# Patient Record
Sex: MALE | Race: WHITE | HISPANIC OR LATINO | Employment: UNEMPLOYED | URBAN - METROPOLITAN AREA
[De-identification: names, ages, dates, MRNs, and addresses within clinical notes are randomized per-mention and may not be internally consistent; named-entity substitution may affect disease eponyms.]

---
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
## 2025-04-15 ENCOUNTER — TELEPHONE (OUTPATIENT)
Age: 20
End: 2025-04-15

## 2025-04-15 NOTE — TELEPHONE ENCOUNTER
New Patient   Insurance   Current Insurance? Did not have on hand at time of call - will bring to OV   Insurance E-verified?    History   Reason for appointment/active symptoms?  Requested a check up, was embarrassed to give full details but has concerns he would like addressed     Has the patient had any previous Urologist(s)? No    Was the patient seen in the ED? No    Labs/Imaging(Including Out Of Network)? No    Records Requested? N/A  Records Visible in EPIC? N/A    Personal history of cancer? No    Appointment   Office location preference: [place]    Appointment Details   Date: 4/16/2025  Time: 9:00am  Location: [place]  Provider: Mehran Andrade  Does the appointment need further review? No

## 2025-04-16 ENCOUNTER — OFFICE VISIT (OUTPATIENT)
Dept: UROLOGY | Facility: CLINIC | Age: 20
End: 2025-04-16
Payer: COMMERCIAL

## 2025-04-16 VITALS
HEART RATE: 72 BPM | SYSTOLIC BLOOD PRESSURE: 116 MMHG | DIASTOLIC BLOOD PRESSURE: 68 MMHG | WEIGHT: 224 LBS | BODY MASS INDEX: 33.95 KG/M2 | HEIGHT: 68 IN | OXYGEN SATURATION: 98 %

## 2025-04-16 DIAGNOSIS — R86.9 SEMEN ABNORMALITY: Primary | ICD-10-CM

## 2025-04-16 PROCEDURE — 99203 OFFICE O/P NEW LOW 30 MIN: CPT | Performed by: PHYSICIAN ASSISTANT

## 2025-04-16 NOTE — PROGRESS NOTES
"    UROLOGY CONSULTATION NOTE     Patient Identifiers: Erick Martines (MRN: [id number])  Service Requesting Consultation: Referral Self    Service Providing Consultation:  Urology, Mehran Andrade PA-C  Consults  Date of Service: 4/16/2025    Reason for Consultation: Problem with semen    History of Present Illness:     Erick Martines is a 19 y.o. male with no prior urologic history.  Self scheduled an appointment.  He states his semen has a strange odor.  Denies any STD.  Is in a monogamous relationship.  Is nervous and wanted to get checked.    Past Medical, Past Surgical History:   History reviewed. No pertinent past medical history.:    History reviewed. No pertinent surgical history.:    Medications, Allergies:   No current outpatient medications on file.    Allergies:  No Known Allergies:    Social and Family History:   Social History:   Social History     Tobacco Use    Smoking status: Never     Passive exposure: Past    Smokeless tobacco: Never   Substance Use Topics    Alcohol use: Yes     Comment: occasionally    Drug use: Not Currently   .    Social History     Tobacco Use   Smoking Status Never    Passive exposure: Past   Smokeless Tobacco Never       Family History:  History reviewed. No pertinent family history.:     Review of Systems:     General: Fever, chills, or night sweats: negative  Cardiac: Negative for chest pain.    Pulmonary: Negative for shortness of breath.  Gastrointestinal: Abdominal pain negative.  Nausea, vomiting, or diarrhea negative,  Genitourinary: See HPI above.  Patient does not have hematuria.  All other systems queried were negative.    Physical Exam:   General: Patient is pleasant and in NAD. Awake and alert  /68 (BP Location: Left arm, Patient Position: Sitting, Cuff Size: Standard)   Pulse 72   Ht 5' 8\" (1.727 m)   Wt 102 kg (224 lb)   SpO2 98%   BMI 34.06 kg/m²   HEENT:  Conjunctiva are clear  Constitutional:  pleasant and cooperative     no apparent " "distress  Cardiac: Peripheral edema: Moodnegative  Pulmonary: Non-labored breathing  Abdomen: Soft, non-tender, non-distended.  No surgical scars.  No masses, tenderness, hernias noted.    Genitourinary: Negative CVA tenderness, negative suprapubic tenderness.  Extremities:  Moves all extremities  Neurological:CNII-XII intact. No numbness or tingling. Essentially non focal neurologic exam  Psychiatric:mood affect and behavior normal    (Male): Penis uncircumcised, phallus normal, meatus patent.  Testicles descended into scrotum bilaterally without masses nor tenderness.  No inguinal hernias bilaterally.  Labs:   No results found for: \"HGB\", \"HCT\", \"WBC\", \"PLT\"]    No results found for: \"NA\", \"K\", \"CL\", \"CO2\", \"BUN\", \"CREATININE\", \"CALCIUM\", \"GLUCOSE\"]    Imaging:   I personally reviewed the images and report of the following studies, and reviewed them with the patient:  None    ASSESSMENT:     #1.  Semen abnormality    PLAN:   - Will check comprehensive semen analysis and culture  - Call with any abnormal findings  - Otherwise follow-up as needed  - Encouraged adequate hydration and to evaluate dietary concerns which could affect the semen    Thank you for allowing me to participate in this patients’ care.  Please do not hesitate to call with any additional questions.  Mehran Andrade PA-C    "

## 2025-04-17 ENCOUNTER — TELEPHONE (OUTPATIENT)
Dept: UROLOGY | Facility: CLINIC | Age: 20
End: 2025-04-17

## 2025-04-17 NOTE — TELEPHONE ENCOUNTER
1st attempt, called patient and left a voicemail message informing patient to fax over his Insurance Card to our Fax # [phone] to upload in his Chart.

## 2025-04-28 ENCOUNTER — APPOINTMENT (OUTPATIENT)
Dept: LAB | Facility: CLINIC | Age: 20
End: 2025-04-28
Attending: PHYSICIAN ASSISTANT
Payer: COMMERCIAL

## 2025-04-28 DIAGNOSIS — R86.9 SEMEN ABNORMALITY: ICD-10-CM

## 2025-04-28 LAB
B ABORTUS AB SMN QL AGGL: SLIGHT
LIQUEFACTION TIME SMN: 30 MIN (ref 0–30)
PH SMN: 8.5 [PH] (ref 7.2–8.6)
SEX ABSTIN DURATION TIME PATIENT: 4 D (ref 2–7)
SPECIMEN VOL SMN: 3.9 ML (ref 1–5)
SPERM # SMN: 257.4 MILLION/EJACULATION (ref 40–1000)
SPERM AMORPHOUS HEAD NFR SMN: 10 %
SPERM MORPH PNL SMN: 8
SPERM MOTILE SMN QL MICRO: 10 %
SPERM MOTILITY SCORE SMN AUTO: 2 (ref 2–4)
SPERM PROG NFR SMN: 2 % (ref 2–4)
SPERM SMN: 0 %
SPERM SMN: 1 %
SPERM SMN: 12 %
SPERM SMN: 13 %
SPERM SMN: 13 %
SPERM SMN: 2 %
SPERM SMN: 3 %
SPERM SMN: 39 % (ref 50–100)
SPERM SMN: 61 % (ref 0–50)
SPERM SMN: 66 /ML (ref 20–999)
SPERM SMN: 7 %
SPERM VIABLE NFR SMN: 86 %
VISC SMN: 3 CP (ref 3–4)
WBC SMN QL: 2 HPF

## 2025-04-28 PROCEDURE — 87070 CULTURE OTHR SPECIMN AEROBIC: CPT

## 2025-04-28 PROCEDURE — 89320 SEMEN ANAL VOL/COUNT/MOT: CPT

## 2025-04-30 LAB — BACTERIA SMN CULT: NORMAL
